# Patient Record
Sex: FEMALE | Race: BLACK OR AFRICAN AMERICAN | Employment: OTHER | URBAN - METROPOLITAN AREA
[De-identification: names, ages, dates, MRNs, and addresses within clinical notes are randomized per-mention and may not be internally consistent; named-entity substitution may affect disease eponyms.]

---

## 2020-04-02 ENCOUNTER — APPOINTMENT (OUTPATIENT)
Dept: CT IMAGING | Age: 85
End: 2020-04-02
Attending: NURSE PRACTITIONER
Payer: MEDICARE

## 2020-04-02 ENCOUNTER — HOSPITAL ENCOUNTER (OUTPATIENT)
Age: 85
Setting detail: OBSERVATION
Discharge: HOME HEALTH | End: 2020-04-03
Attending: EMERGENCY MEDICINE | Admitting: HOSPITALIST
Payer: MEDICARE

## 2020-04-02 ENCOUNTER — APPOINTMENT (OUTPATIENT)
Dept: GENERAL RADIOLOGY | Age: 85
End: 2020-04-02
Attending: NURSE PRACTITIONER
Payer: MEDICARE

## 2020-04-02 DIAGNOSIS — K85.90 ACUTE PANCREATITIS, UNSPECIFIED COMPLICATION STATUS, UNSPECIFIED PANCREATITIS TYPE: ICD-10-CM

## 2020-04-02 DIAGNOSIS — D72.829 LEUKOCYTOSIS, UNSPECIFIED TYPE: ICD-10-CM

## 2020-04-02 DIAGNOSIS — R10.84 ABDOMINAL PAIN, GENERALIZED: Primary | ICD-10-CM

## 2020-04-02 DIAGNOSIS — K35.80 ACUTE APPENDICITIS, UNSPECIFIED ACUTE APPENDICITIS TYPE: ICD-10-CM

## 2020-04-02 PROBLEM — K37 APPENDICITIS: Status: ACTIVE | Noted: 2020-04-02

## 2020-04-02 LAB
ALBUMIN SERPL-MCNC: 2.7 G/DL (ref 3.4–5)
ALBUMIN/GLOB SERPL: 0.6 {RATIO} (ref 0.8–1.7)
ALP SERPL-CCNC: 105 U/L (ref 45–117)
ALT SERPL-CCNC: 25 U/L (ref 13–56)
ANION GAP SERPL CALC-SCNC: 8 MMOL/L (ref 3–18)
APPEARANCE UR: CLEAR
AST SERPL-CCNC: 39 U/L (ref 10–38)
BACTERIA URNS QL MICRO: ABNORMAL /HPF
BASOPHILS # BLD: 0.1 K/UL (ref 0–0.1)
BASOPHILS NFR BLD: 0 % (ref 0–2)
BILIRUB SERPL-MCNC: 0.5 MG/DL (ref 0.2–1)
BILIRUB UR QL: NEGATIVE
BNP SERPL-MCNC: 8004 PG/ML (ref 0–1800)
BUN SERPL-MCNC: 43 MG/DL (ref 7–18)
BUN/CREAT SERPL: 35 (ref 12–20)
CALCIUM SERPL-MCNC: 9 MG/DL (ref 8.5–10.1)
CHLORIDE SERPL-SCNC: 108 MMOL/L (ref 100–111)
CO2 SERPL-SCNC: 23 MMOL/L (ref 21–32)
COLOR UR: YELLOW
CREAT SERPL-MCNC: 1.23 MG/DL (ref 0.6–1.3)
DIFFERENTIAL METHOD BLD: ABNORMAL
EOSINOPHIL # BLD: 0.6 K/UL (ref 0–0.4)
EOSINOPHIL NFR BLD: 3 % (ref 0–5)
EPITH CASTS URNS QL MICRO: ABNORMAL /LPF (ref 0–5)
ERYTHROCYTE [DISTWIDTH] IN BLOOD BY AUTOMATED COUNT: 14.5 % (ref 11.6–14.5)
GLOBULIN SER CALC-MCNC: 4.7 G/DL (ref 2–4)
GLUCOSE SERPL-MCNC: 135 MG/DL (ref 74–99)
GLUCOSE UR STRIP.AUTO-MCNC: NEGATIVE MG/DL
HCT VFR BLD AUTO: 36.7 % (ref 35–45)
HGB BLD-MCNC: 12.1 G/DL (ref 12–16)
HGB UR QL STRIP: NEGATIVE
KETONES UR QL STRIP.AUTO: NEGATIVE MG/DL
LACTATE SERPL-SCNC: 0.8 MMOL/L (ref 0.4–2)
LEUKOCYTE ESTERASE UR QL STRIP.AUTO: NEGATIVE
LIPASE SERPL-CCNC: 3072 U/L (ref 73–393)
LYMPHOCYTES # BLD: 2.8 K/UL (ref 0.9–3.6)
LYMPHOCYTES NFR BLD: 15 % (ref 21–52)
MCH RBC QN AUTO: 25.6 PG (ref 24–34)
MCHC RBC AUTO-ENTMCNC: 33 G/DL (ref 31–37)
MCV RBC AUTO: 77.6 FL (ref 74–97)
MONOCYTES # BLD: 1.2 K/UL (ref 0.05–1.2)
MONOCYTES NFR BLD: 6 % (ref 3–10)
NEUTS SEG # BLD: 13.9 K/UL (ref 1.8–8)
NEUTS SEG NFR BLD: 76 % (ref 40–73)
NITRITE UR QL STRIP.AUTO: NEGATIVE
PH UR STRIP: 5 [PH] (ref 5–8)
PLATELET # BLD AUTO: 493 K/UL (ref 135–420)
PMV BLD AUTO: 9.8 FL (ref 9.2–11.8)
POTASSIUM SERPL-SCNC: 5.2 MMOL/L (ref 3.5–5.5)
PROT SERPL-MCNC: 7.4 G/DL (ref 6.4–8.2)
PROT UR STRIP-MCNC: 30 MG/DL
RBC # BLD AUTO: 4.73 M/UL (ref 4.2–5.3)
RBC #/AREA URNS HPF: ABNORMAL /HPF (ref 0–5)
SODIUM SERPL-SCNC: 139 MMOL/L (ref 136–145)
SP GR UR REFRACTOMETRY: 1.02 (ref 1–1.03)
TROPONIN I SERPL-MCNC: <0.02 NG/ML (ref 0–0.04)
URATE CRY URNS QL MICRO: ABNORMAL
UROBILINOGEN UR QL STRIP.AUTO: 1 EU/DL (ref 0.2–1)
WBC # BLD AUTO: 18.5 K/UL (ref 4.6–13.2)
WBC URNS QL MICRO: ABNORMAL /HPF (ref 0–4)

## 2020-04-02 PROCEDURE — 71045 X-RAY EXAM CHEST 1 VIEW: CPT

## 2020-04-02 PROCEDURE — 81001 URINALYSIS AUTO W/SCOPE: CPT

## 2020-04-02 PROCEDURE — 80053 COMPREHEN METABOLIC PANEL: CPT

## 2020-04-02 PROCEDURE — 99285 EMERGENCY DEPT VISIT HI MDM: CPT

## 2020-04-02 PROCEDURE — 96375 TX/PRO/DX INJ NEW DRUG ADDON: CPT

## 2020-04-02 PROCEDURE — 85025 COMPLETE CBC W/AUTO DIFF WBC: CPT

## 2020-04-02 PROCEDURE — 99218 HC RM OBSERVATION: CPT

## 2020-04-02 PROCEDURE — 96367 TX/PROPH/DG ADDL SEQ IV INF: CPT

## 2020-04-02 PROCEDURE — 84484 ASSAY OF TROPONIN QUANT: CPT

## 2020-04-02 PROCEDURE — 83605 ASSAY OF LACTIC ACID: CPT

## 2020-04-02 PROCEDURE — 93005 ELECTROCARDIOGRAM TRACING: CPT

## 2020-04-02 PROCEDURE — 74011250637 HC RX REV CODE- 250/637: Performed by: NURSE PRACTITIONER

## 2020-04-02 PROCEDURE — 74011636320 HC RX REV CODE- 636/320: Performed by: EMERGENCY MEDICINE

## 2020-04-02 PROCEDURE — 83880 ASSAY OF NATRIURETIC PEPTIDE: CPT

## 2020-04-02 PROCEDURE — 74011250636 HC RX REV CODE- 250/636: Performed by: NURSE PRACTITIONER

## 2020-04-02 PROCEDURE — 96365 THER/PROPH/DIAG IV INF INIT: CPT

## 2020-04-02 PROCEDURE — 74177 CT ABD & PELVIS W/CONTRAST: CPT

## 2020-04-02 PROCEDURE — 74011000258 HC RX REV CODE- 258: Performed by: NURSE PRACTITIONER

## 2020-04-02 PROCEDURE — 74011000258 HC RX REV CODE- 258: Performed by: HOSPITALIST

## 2020-04-02 PROCEDURE — 83690 ASSAY OF LIPASE: CPT

## 2020-04-02 RX ORDER — ACETAMINOPHEN 325 MG/1
650 TABLET ORAL ONCE
Status: COMPLETED | OUTPATIENT
Start: 2020-04-02 | End: 2020-04-02

## 2020-04-02 RX ORDER — MORPHINE SULFATE 2 MG/ML
2 INJECTION, SOLUTION INTRAMUSCULAR; INTRAVENOUS
Status: DISCONTINUED | OUTPATIENT
Start: 2020-04-02 | End: 2020-04-03 | Stop reason: HOSPADM

## 2020-04-02 RX ORDER — CEFTRIAXONE 1 G/1
1 INJECTION, POWDER, FOR SOLUTION INTRAMUSCULAR; INTRAVENOUS EVERY 24 HOURS
Status: DISCONTINUED | OUTPATIENT
Start: 2020-04-02 | End: 2020-04-02

## 2020-04-02 RX ORDER — DEXTROSE MONOHYDRATE AND SODIUM CHLORIDE 5; .45 G/100ML; G/100ML
75 INJECTION, SOLUTION INTRAVENOUS CONTINUOUS
Status: DISCONTINUED | OUTPATIENT
Start: 2020-04-02 | End: 2020-04-03 | Stop reason: HOSPADM

## 2020-04-02 RX ORDER — SODIUM CHLORIDE 9 MG/ML
100 INJECTION, SOLUTION INTRAVENOUS ONCE
Status: COMPLETED | OUTPATIENT
Start: 2020-04-02 | End: 2020-04-02

## 2020-04-02 RX ORDER — METRONIDAZOLE 500 MG/100ML
500 INJECTION, SOLUTION INTRAVENOUS EVERY 8 HOURS
Status: DISCONTINUED | OUTPATIENT
Start: 2020-04-02 | End: 2020-04-03 | Stop reason: HOSPADM

## 2020-04-02 RX ORDER — ONDANSETRON 2 MG/ML
4 INJECTION INTRAMUSCULAR; INTRAVENOUS
Status: COMPLETED | OUTPATIENT
Start: 2020-04-02 | End: 2020-04-02

## 2020-04-02 RX ORDER — HEPARIN SODIUM 5000 [USP'U]/ML
5000 INJECTION, SOLUTION INTRAVENOUS; SUBCUTANEOUS EVERY 8 HOURS
Status: DISCONTINUED | OUTPATIENT
Start: 2020-04-02 | End: 2020-04-03 | Stop reason: HOSPADM

## 2020-04-02 RX ORDER — ACETAMINOPHEN 325 MG/1
650 TABLET ORAL
Status: DISCONTINUED | OUTPATIENT
Start: 2020-04-02 | End: 2020-04-03 | Stop reason: HOSPADM

## 2020-04-02 RX ADMIN — SODIUM CHLORIDE 3.38 G: 900 INJECTION INTRAVENOUS at 17:00

## 2020-04-02 RX ADMIN — CEFTRIAXONE 1 G: 1 INJECTION, POWDER, FOR SOLUTION INTRAMUSCULAR; INTRAVENOUS at 16:19

## 2020-04-02 RX ADMIN — ACETAMINOPHEN 650 MG: 325 TABLET, FILM COATED ORAL at 13:25

## 2020-04-02 RX ADMIN — DEXTROSE MONOHYDRATE AND SODIUM CHLORIDE 75 ML/HR: 5; .45 INJECTION, SOLUTION INTRAVENOUS at 21:39

## 2020-04-02 RX ADMIN — SODIUM CHLORIDE 100 ML/HR: 900 INJECTION, SOLUTION INTRAVENOUS at 13:23

## 2020-04-02 RX ADMIN — ONDANSETRON 4 MG: 2 INJECTION INTRAMUSCULAR; INTRAVENOUS at 13:23

## 2020-04-02 RX ADMIN — IOPAMIDOL 90 ML: 612 INJECTION, SOLUTION INTRAVENOUS at 15:28

## 2020-04-02 NOTE — H&P
Internal Medicine History and Physical          Subjective     HPI: [facility] is a 80 y.o. female AAF with abdominal pain. The patient is previous hospice patient for lung cancer deferred treatment, inoperable triple vessel disease who presented to the ED with several days of post-prandial pain. She states she is unable to tolerate hard food but liquids and soft do not cause the problem. She denies any nausea or vomiting. She admits to chronic abdominal pain over the past several months similar to this all after eating. She admits to coughing after drinking liquids. She denies any fever or chills. She hasn't had BM for a few days. She denies any SOB or CP. In the ED, on evaluation, she had no pain on palpation of her abdomen, including deep palpation. She did have decreased BS B/L. She was weak as well, which she admits to over past several days. Her vitals were WNL, with no fever. HR WNL. She had elevated white count but no other SIRS criteria. She had elevated lipase as well, but, again no abdominal pain on palpation nor N/V to diagnose pancreatitis. CT abd/pelvis was completed and it showed early signs of appendicitis. Given her age, co-morbidities (including lung ca, triple-vessel disease, morbid obesity), it is unlikely she is a candidate for surgery. She is not septic. Gen surg evaluated and no surgery intervention at this time.     PMHx:  Past Medical History:   Diagnosis Date    Chronic pain     CKD (chronic kidney disease) stage 3, GFR 30-59 ml/min ([facility])     Constipation     Diastolic CHF ([facility])     echo 70% 7/14 - Dr. Glory Harley DM2 (diabetes mellitus, type 2) ([facility] [place] [address]     GERD (gastroesophageal reflux disease)     Hyperlipidemia     Hypertension     Lung mass     Osteoarthritis of spine     back    Osteopenia     dexa 5/14    Pacemaker     PAD (peripheral artery disease) ([facility])     PAF (paroxysmal atrial fibrillation) ([facility])     history of    ROM (renal artery stenosis) ([address] 12/12    by ALEXY  Sick sinus syndrome (HCC)     pacer    STEMI (ST elevation myocardial infarction) ([facility] [place] [address]     Thyroid nodule     2 & 1 cm - need bx    Uterine prolapse     pessary? PSurgHx:  Past Surgical History:   Procedure Laterality Date    HX HEART CATHETERIZATION      HX Juliano Lubes Right     Dr. Yashira Thakur:  Social History     Socioeconomic History    Marital status:      Spouse name: Not on file    Number of children: Not on file    Years of education: Not on file    Highest education level: Not on file   Occupational History    Occupation: cook - retired   Social Needs    Financial resource strain: Not on file    Food insecurity     Worry: Not on file     Inability: Not on file   [employer] needs     Medical: Not on file     Non-medical: Not on file   Tobacco Use    Smoking status: Former Smoker     Types: Cigarettes     Last attempt to quit: 1993     Years since quittin.0   Substance and Sexual Activity    Alcohol use: No    Drug use: No    Sexual activity: Never     Comment:    Lifestyle    Physical activity     Days per week: Not on file     Minutes per session: Not on file    Stress: Not on file   Relationships    Social connections     Talks on phone: Not on file     Gets together: Not on file     Attends Christianity service: Not on file     Active member of club or organization: Not on file     Attends meetings of clubs or organizations: Not on file     Relationship status: Not on file    Intimate partner violence     Fear of current or ex partner: Not on file     Emotionally abused: Not on file     Physically abused: Not on file     Forced sexual activity: Not on file   Other Topics Concern    Not on file   Social History Narrative    Not on file       Allergies:   Allergies   Allergen Reactions    Ciprofloxacin Palpitations       FamilyHx:  Family History   Problem Relation Age of Onset    Kidney Disease Mother Prior to Admission Medications   Prescriptions Last Dose Informant Patient Reported? Taking? HYDROcodone-acetaminophen (NORCO)  mg tablet   No No   Sig: Take 1 Tab by mouth four (4) times daily as needed for Pain. Max Daily Amount: 4 Tabs. amiodarone (CORDARONE) 200 mg tablet   No No   Sig: Take 1 Tab by mouth daily. aspirin 81 mg chewable tablet   Yes No   Sig: Take 81 mg by Mouth Once a Day. docusate sodium (COLACE) 100 mg capsule   Yes No   Sig: Take 100 mg by mouth two (2) times a day. fentaNYL (DURAGESIC) 50 mcg/hr PATCH   No No   Si Patch by TransDERmal route every seventy-two (72) hours. Max Daily Amount: 1 Patch. fentaNYL (DURAGESIC) 75 mcg/hr   No No   Si Patch by TransDERmal route every seventy-two (72) hours. Max Daily Amount: 1 Patch. furosemide (LASIX) 40 mg tablet   No No   Sig: TAKE 1 TABLET (40 MG) ONCE A DAY IN THE AM AND THEN MAY USE 2ND DOSE IN EVENING AS NEEDED FOR SWELLING.   guaiFENesin-codeine (ROBITUSSIN AC)  mg/5 mL solution   No No   Sig: Take 10 mL by mouth nightly. Max Daily Amount: 10 mL.   hydrALAZINE (APRESOLINE) 25 mg tablet   No No   Sig: Take 1 Tab by mouth three (3) times daily. isosorbide dinitrate (ISORDIL) 30 mg tablet   No No   Sig: Take 1 Tab by mouth two (2) times a day. metoprolol succinate (TOPROL-XL) 50 mg XL tablet   No No   Sig: Take 1 Tab by mouth daily. morphine (ROXANOL) 100 mg/5 mL (20 mg/mL) concentrated solution   No No   Si - 10 mg every 2 hours as needed for SOB or pain. ondansetron hcl (ZOFRAN, AS HYDROCHLORIDE,) 4 mg tablet   Yes No   Sig: Take 4 mg by mouth every eight (8) hours as needed for Nausea. potassium chloride (K-DUR, KLOR-CON) 20 mEq tablet   No No   Sig: Take 0.5 tablets by mouth two (2) times a day. promethazine (PHENERGAN) 25 mg tablet   No No   Sig: Take 1 Tab by mouth every six (6) hours as needed for Nausea. senna (SENNA) 8.6 mg tablet   Yes No   Sig: Take 1 tablet by mouth daily. Facility-Administered Medications: None       Review of Systems:  CONST: Fever, weight loss, fatigue or chills  HEENT: Recent changes in vision, vertigo, epistaxis, dysphagia and hoarseness  CV: Chest pain, palpitations, edema and varicosities  RESP: Cough, shortness of breath, wheezing, hemoptysis, snoring and reactive airway disease  GI: Nausea, vomiting, abdominal pain, change in bowel habits, hematochezia, melena, and GERD   : Hematuria, dysuria, frequency, urgency, nocturia and stress urinary incontinence   MS: Weakness, joint pain and arthritis  ENDO: Polyuria, polydipsia, polyphagia, poor wound healing, heat intolerance, cold intolerance  LYMPH/HEME: Anemia, bruising and history of blood transfusions  INTEG: Dermatitis, abnormal moles  NEURO: Dizziness, headache, fainting, seizures and stroke  PSYCH: Anxiety and depression      Objective      Visit Vitals  /52   Pulse 62   Temp 97 °F (36.1 °C)   Resp 17   SpO2 96%       Physical Exam:  General Appearance: NAD, conversant  HENT: normocephalic/atraumatic, moist mucus membranes  Lungs: Decreased BS with normal respiratory effort  Cardiovascular: RRR, no m/r/g, capillary refill < 2 sec, B/L DP/PT pulses +3/4  Abdomen: soft, non-tender, normal bowel sounds  Extremities: no cyanosis, no peripheral edema  Neuro: moves all extremities, no focal deficits  Psych: appropriate affect, alert and oriented to person, place and time    Laboratory Studies:  CMP:   Lab Results   Component Value Date/Time     04/02/2020 01:17 PM    K 5.2 04/02/2020 01:17 PM     04/02/2020 01:17 PM    CO2 23 04/02/2020 01:17 PM    AGAP 8 04/02/2020 01:17 PM     (H) 04/02/2020 01:17 PM    BUN 43 (H) 04/02/2020 01:17 PM    CREA 1.23 04/02/2020 01:17 PM    GFRAA 50 (L) 04/02/2020 01:17 PM    GFRNA 41 (L) 04/02/2020 01:17 PM    CA 9.0 04/02/2020 01:17 PM    ALB 2.7 (L) 04/02/2020 01:17 PM    TP 7.4 04/02/2020 01:17 PM    GLOB 4.7 (H) 04/02/2020 01:17 PM    AGRAT 0.6 (L) 04/02/2020 01:17 PM    SGOT 39 (H) 04/02/2020 01:17 PM    ALT 25 04/02/2020 01:17 PM     CBC:   Lab Results   Component Value Date/Time    WBC 18.5 (H) 04/02/2020 01:17 PM    HGB 12.1 04/02/2020 01:17 PM    HCT 36.7 04/02/2020 01:17 PM     (H) 04/02/2020 01:17 PM     All Cardiac Markers in the last 24 hours:   Lab Results   Component Value Date/Time    TROIQ <0.02 04/02/2020 01:17 PM       Imaging Reviewed:  Ct Abd Pelv W Cont    Result Date: 4/2/2020  EXAM: CT of the abdomen and pelvis INDICATION: Pain. COMPARISON: None. TECHNIQUE: Axial CT imaging of the abdomen and pelvis was performed with intravenous contrast. Multiplanar reformats were generated. One or more dose reduction techniques were used on this CT: automated exposure control, adjustment of the mAs and/or kVp according to patient size, and iterative reconstruction techniques. The specific techniques used on this CT exam have been documented in the patient's electronic medical record. Digital Imaging and Communications in Medicine (DICOM) format image data are available to nonaffiliated external healthcare facilities or entities on a secure, media free, reciprocally searchable basis with patient authorization for at least a 12-month period after this study. _______________ FINDINGS: LOWER CHEST: Patchy bilateral interstitial infiltrates with groundglass densities. Cardiomegaly. LIVER, BILIARY: Liver is normal. No biliary dilation. Gallbladder is unremarkable. PANCREAS: Normal. SPLEEN: Normal. ADRENALS: Normal. KIDNEYS/URETERS/BLADDER: No calcification, hydronephrosis, or soft tissue attenuation renal mass. Bilateral cortical defects versus cysts. PELVIC ORGANS: Unremarkable. VASCULATURE: Extensive vascular calcifications LYMPH NODES: No enlarged lymph nodes. GASTROINTESTINAL TRACT: Appendix measures up to 9 mm in diameter with mild periappendiceal stranding. Diverticulosis. No drainable fluid collection.  BONES: No acute or aggressive osseous abnormalities identified. Right hip arthroplasty. OTHER: None. _______________     IMPRESSION: Findings suggestive of early acute appendicitis without perforation or abscess formation. Diverticulosis. Cardiomegaly Bilateral lung base interstitial infiltrates and groundglass opacities, representing edema versus atypical infection. Xr Chest Port    Result Date: 4/2/2020  EXAM: XR CHEST PORT CLINICAL INDICATION/HISTORY: SOB, dyspnea -Additional: None COMPARISON: None TECHNIQUE: Portable frontal view of the chest _______________ FINDINGS: SUPPORT DEVICES: None. HEART AND MEDIASTINUM: Cardiomegaly. Left chest last views/pacemaker. LUNGS AND PLEURAL SPACES: Hypoinflated lungs. Small right and small to moderate left pleural effusions with adjacent atelectasis. No pneumothorax. Mild interstitial edema. _______________     IMPRESSION: Hypoinflated lungs. Small right and small to moderate left pleural effusions with adjacent atelectasis. No pneumothorax. Mild interstitial edema. Assessment/Plan     Active Hospital Problems    Diagnosis Date Noted    Appendicitis 20/73/3598    Diastolic CHF (HCC)      echo 70% 7/14 - Dr. Valeria Garza      Paroxysmal atrial fibrillation ([facility] [address] 07/09/2014    HTN (hypertension) 07/09/2014    CKD (chronic kidney disease) stage 3, GFR 30-59 ml/min ([facility] [address] 07/09/2014     [facility], Dr Jhonatan Hay         CAD (coronary artery disease) 07/09/2014     Stress test 7/2013  Nuclear imaging reveals a moderate area of inferolateral scar. There is   no definite evidence of reversible myocardial ischemia.   The global ejection fraction is 64% with no regional wall motion   abnormalities.         - NPO for now  - IV fluids  - Antibx per gensurg, will use rocephin given local antibiogram and allergy to cipro  - No plans for intervention  - Lipase is high, but nothing else to suggest pancreatitis  - PT/OT  - Cont acceptable home medications for chronic conditions   - DVT protocol    I have personally reviewed all pertinent labs, films and EKGs that have officially resulted. I reviewed available electronic documentation outlining the initial presentation as well as the emergency room physician's encounter.     Elsie Tucker DO  Internal Medicine, Hospitalist  Pager: [phone]

## 2020-04-02 NOTE — PROGRESS NOTES
Called to evaluate 81 yo AAF with abdominal pain. The patient is previous hospice patient for lung cancer deferred treatment, inoperable triple vessel disease who presented to the ED with several days of post-prandial pain. She states she is unable to tolerate hard food but liquids and soft do not cause the problem. She denies any nausea or vomiting. She admits to chronic abdominal pain over the past several months similar to this all after eating. She admits to coughing after drinking liquids. She denies any fever or chills. She hasn't had BM for a few days. She denies any SOB or CP. In the ED, on evaluation, she had no pain on palpation of her abdomen, including deep palpation. She did have decreased BS B/L. She was weak as well, which she admits to over past several days. Her vitals were WNL, with no fever. HR WNL. She had elevated white count but no other SIRS criteria. She had elevated lipase as well, but, again no abdominal pain on palpation nor N/V to diagnose pancreatitis. CT abd/pelvis was completed and it showed early signs of appendicitis. Given her age, co-morbidities (including lung ca, triple-vessel disease, morbid obesity), it is unlikely she is a candidate for surgery. She is not septic at this time and tolerating liquid diet thus she should be able to tolerate oral antibiotics after IV dosing today. Will defer further management to general surgery which is being contacted at this time. She is not interested in inpatient rehab thus home health with PT would definitely benefit this patient for her ambulatory issues. She would likely benefit from speech/swallow evaluation in the outpatient setting given her cough and CT findings that may point to chronic aspiration.  I discussed the case with Dr. Kari Zendejas and PA in ED and told them to page me should general surgery have a difference in opinion on management    [place], DO  Internal Medicine, Hospitalist  Pager: [phone]

## 2020-04-02 NOTE — CONSULTS
[address]    Name:  Arabella Marcus  MR#:   [id number]  :  1933  ACCOUNT #:  [de-identified]  DATE OF SERVICE:  2020      REASON FOR CONSULTATION:  Abdominal pain, possible appendicitis. HISTORY OF PRESENT ILLNESS:  The patient is an 14-year-old, debilitated woman who apparently came from home with a 4-month history of eating poorly, particularly in the last few days. She typically has abdominal pain after eating but had no nausea or vomiting. She has had no fever or chills but says she has an occasional cough particularly while trying to eat. She presents to the emergency room with on and off history of abdominal pain. Currently, she has no abdominal pain. I have been asked to see her after she was CT scanned and that indicated a possible early appendicitis. She is awake, alert, and oriented and can answer questions easily. She denies having abdominal pain. I can elicit no pain as well. PAST MEDICAL HISTORY:  Heart failure, uterine prolapse, coronary artery disease of at least 3 vessels that is not reconstructible. Diabetes, hypertension, chronic renal disease stage III, renal insufficiency, paroxysmal atrial fibrillation, sick sinus syndrome, history of H. pylori, dyslipidemia, STEMI at some point, time unknown, chronic constipation, chronic pain, renal stenosis, osteopenia, thyroid nodule, lung cancer, pulmonary emboli, chest pain, history of UTIs, pacemaker, and morbid obesity. MEDICATIONS:  Amiodarone, Eliquis, aripiprazole, aspirin, Colace, Aricept, Cymbalta, Duragesic, Lasix, gabapentin, Neurontin, Robitussin, Apresoline, Norco, cortisone, Isordil, Ativan, metoprolol, Prilosec, Zofran, OxyContin, K-Dur, Phenergan, Senna, and multivitamins. FAMILY HISTORY:  Her family history is not obtainable.     REVIEW OF SYSTEMS:  Negative for headache, negative for vision problems, negative for HEENT problems, negative for chest pain, negative for shortness of breath, positive for cough, negative for abdominal pain, negative for  symptoms, negative for musculoskeletal symptoms, negative for urologic symptoms, negative for neurologic symptoms, and negative for psychiatric symptoms. PHYSICAL EXAMINATION:  GENERAL:  She is awake, alert, oriented x3 in no apparent distress. HEAD AND NECK:  Normocephalic, atraumatic. Her pupils are equal.  Her sclerae are anicteric. Nose and throat are clear. CHEST:  Clear bilaterally. HEART:  Regular rate and rhythm. ABDOMEN:  Soft, nontender, without rebound or guarding. EXTREMITIES:  Warm and dry. NEUROLOGIC:  She is intact. LABORATORY DATA:  White blood cell count of 18,500 with a mild left shift. Her electrolytes are normal except for a glucose of 135. She has a BUN of 43 and a creatinine of 1.23 both of which are stable. She has a normal bilirubin. Her liver function tests are essentially normal.  Her lipase is 3072. Her troponin is less than 0.02, and her BNP is 8.004. She has a CT scan as above which I have reviewed the images of, and she has essentially no acute changes. She does have a measured appendix of 9 mm which led to a possible early appendicitis conjecture; however, this is anything but certain. IMPRESSION:  I doubt seriously she has appendicitis and that she is nontender and has not had any symptoms of the disease. She does have a hyperlipasemia suggesting a chemical pancreatitis, but on CT scan, her pancreas while generous has no fluid collections or inflammatory changes. In either situation, she does not have an acute abdomen. She could have intestinal angina given her long history of eating and having abdominal pain; however, this is chronic in nature and not pertinent to today's visit. No significant signs of appendicitis. No surgery planned. She should have antibiotics for her white count and consideration for admission for failure to thrive.   We will follow with you if she is admitted.       Paradise Mcguire MD [id number]  D:  04/02/2020 17:32  T:  04/02/2020 18:57  JOB #:  [id number]

## 2020-04-02 NOTE — PROGRESS NOTES
Surgery  Asked to see this 79 yo woman with hx of 4 mo of abd pain after eating. Currently non-tender and without rebound or guarding. She has a hx of Lung cancer and severe non-reconstructable CAD. She is fairly debilitated and is currently not eating or drinking. Her PE is unimpressive and I can elicit no abd pain. She can answer some questions and is Ox3  Labs show a WBC of 18k and a Lipase of 3000. UA is OK  I have reviewed the images of her CT which is read as poss. Early appendicitis but this is pretty soft based on a 9mm appy. Given her hx and PE I doubt she has Appendicitis, but by chemistry she has Pancreatitis. Intestinal angina is also not out of the question. No Plan for surgery, would treat with Cipro/Flagyl for WBC. Should she develop signs more like appendicitis would perc drain and continue abx.    If admitted will follow

## 2020-04-02 NOTE — ED PROVIDER NOTES
EMERGENCY DEPARTMENT HISTORY AND PHYSICAL EXAM    4:04 PM      Date: 4/2/2020  Patient Name: Gilbert Banegas    History of Presenting Illness     Chief Complaint   Patient presents with    Abdominal Pain    Decreased Appetite    Fatigue     History Provided By: Patient  Chief complaint: Abdominal pain with nausea, decreased appetite, fatigue. Ongoing times several months. Worsening in the last 4 to 5 days. With associated generalized weakness. No focal neuro deficits. No chest pain or chest discomfort. No shortness of breath. No vomiting or diarrhea. No constipation. No bilateral lower leg edema or calf pain. Additional History (Context): Gilbert Banegas is a 80 y.o. female with past medical history as noted below who presented to the ED as noted above.     PCP: Camilla Yanes DO    Current Facility-Administered Medications   Medication Dose Route Frequency Provider Last Rate Last Dose    acetaminophen (TYLENOL) tablet 650 mg  650 mg Oral Q4H PRN Beather He H, DO        dextrose 5 % - 0.45% NaCl infusion  75 mL/hr IntraVENous CONTINUOUS Beather He H, DO        heparin (porcine) injection 5,000 Units  5,000 Units SubCUTAneous Q8H Beather He H, DO        morphine injection 2 mg  2 mg IntraVENous Q3H PRN Ky Plate, DO        [START ON 4/3/2020] cefTRIAXone (ROCEPHIN) 1 g in 0.9% sodium chloride (MBP/ADV) 50 mL MBP  1 g IntraVENous Q24H Beather He H, DO        metroNIDAZOLE (FLAGYL) IVPB premix 500 mg  500 mg IntraVENous Q8H Ky Plate, DO           Past History     Past Medical History:  Past Medical History:   Diagnosis Date    Chronic pain     CKD (chronic kidney disease) stage 3, GFR 30-59 ml/min (HCC)     Constipation     Diastolic CHF (HCC)     echo 70% 7/14 - Dr. Patti Salas    DM2 (diabetes mellitus, type 2) ([facility]     GERD (gastroesophageal reflux disease)     Hyperlipidemia     Hypertension     Lung mass     Osteoarthritis of spine back    Osteopenia     dexa     Pacemaker     [facility]     PAF (paroxysmal atrial fibrillation) ([facility])     history of    ROM (renal artery stenosis) ([facility]     by PVL    Sick sinus syndrome ([facility])     pacer    [facility]     Thyroid nodule     2 & 1 cm - need bx    Uterine prolapse     pessary? Past Surgical History:  Past Surgical History:   Procedure Laterality Date    HX HEART CATHETERIZATION      HX Len Christensen Right     Dr. Ollie Carlton HX PACEMAKER         Family History:  Family History   Problem Relation Age of Onset    Kidney Disease Mother        Social History:  Social History     Tobacco Use    Smoking status: Former Smoker     Types: Cigarettes     Last attempt to quit: 1993     Years since quittin.0   Substance Use Topics    Alcohol use: No    Drug use: No       Allergies: Allergies   Allergen Reactions    Ciprofloxacin Palpitations         Review of Systems       Review of Systems   Constitutional: Positive for appetite change and fatigue. Negative for chills, diaphoresis and fever. HENT: Negative for congestion, ear pain and sore throat. Eyes: Negative for pain. Respiratory: Negative for cough, chest tightness, shortness of breath and wheezing. Cardiovascular: Negative for chest pain, palpitations and leg swelling. Gastrointestinal: Positive for abdominal pain and nausea. Negative for constipation, diarrhea and vomiting. Genitourinary: Negative for dysuria, flank pain, hematuria, pelvic pain, vaginal bleeding and vaginal discharge. Musculoskeletal: Negative for back pain, joint swelling, neck pain and neck stiffness. Neurological: Positive for weakness. Negative for dizziness, light-headedness and headaches.          Physical Exam     Visit Vitals  /62 (BP 1 Location: Right arm, BP Patient Position: Head of bed elevated (Comment degrees))   Pulse 64   Temp 97.4 °F (36.3 °C)   Resp 17   Wt 69.6 kg (153 lb 6.4 oz)   SpO2 95%   BMI 30.98 kg/m²         Physical Exam  Vitals signs and nursing note reviewed. Constitutional:       General: She is not in acute distress. Appearance: Normal appearance. She is not ill-appearing, toxic-appearing or diaphoretic. HENT:      Head: Normocephalic and atraumatic. Neck:      Musculoskeletal: Normal range of motion and neck supple. No neck rigidity or muscular tenderness. Cardiovascular:      Rate and Rhythm: Normal rate and regular rhythm. Pulses: Normal pulses. Heart sounds: Normal heart sounds. No murmur. Pulmonary:      Effort: Pulmonary effort is normal. No respiratory distress. Breath sounds: Normal breath sounds. No wheezing. Abdominal:      General: Bowel sounds are normal. There is no distension. Palpations: Abdomen is soft. Tenderness: There is generalized abdominal tenderness. There is no right CVA tenderness, left CVA tenderness, guarding or rebound. Negative signs include James's sign. Musculoskeletal: Normal range of motion. Skin:     General: Skin is warm and dry. Capillary Refill: Capillary refill takes less than 2 seconds. Neurological:      General: No focal deficit present. Mental Status: She is alert and oriented to person, place, and time.    Psychiatric:         Behavior: Behavior normal.       Diagnostic Study Results     Labs -  Recent Results (from the past 12 hour(s))   EKG, 12 LEAD, INITIAL    Collection Time: 04/02/20  1:09 PM   Result Value Ref Range    Ventricular Rate 62 BPM    Atrial Rate 62 BPM    P-R Interval 96 ms    QRS Duration 76 ms    Q-T Interval 440 ms    QTC Calculation (Bezet) 446 ms    Calculated P Axis -76 degrees    Calculated R Axis 10 degrees    Calculated T Axis 167 degrees    Diagnosis       Unusual P axis and short NJ, probable junctional rhythm  Voltage criteria for left ventricular hypertrophy ( R in aVL , Sokolow-Rojas ,   [place] product )  Marked ST abnormality, possible lateral subendocardial injury  Abnormal ECG  No previous ECGs available     CBC WITH AUTOMATED DIFF    Collection Time: 04/02/20  1:17 PM   Result Value Ref Range    WBC 18.5 (H) 4.6 - 13.2 K/uL    RBC 4.73 4.20 - 5.30 M/uL    HGB 12.1 12.0 - 16.0 g/dL    HCT 36.7 35.0 - 45.0 %    MCV 77.6 74.0 - 97.0 FL    MCH 25.6 24.0 - 34.0 PG    MCHC 33.0 31.0 - 37.0 g/dL    RDW 14.5 11.6 - 14.5 %    PLATELET 614 (H) 885 - 420 K/uL    MPV 9.8 9.2 - 11.8 FL    NEUTROPHILS 76 (H) 40 - 73 %    LYMPHOCYTES 15 (L) 21 - 52 %    MONOCYTES 6 3 - 10 %    EOSINOPHILS 3 0 - 5 %    BASOPHILS 0 0 - 2 %    ABS. NEUTROPHILS 13.9 (H) 1.8 - 8.0 K/UL    ABS. LYMPHOCYTES 2.8 0.9 - 3.6 K/UL    ABS. MONOCYTES 1.2 0.05 - 1.2 K/UL    ABS. EOSINOPHILS 0.6 (H) 0.0 - 0.4 K/UL    ABS. BASOPHILS 0.1 0.0 - 0.1 K/UL    DF AUTOMATED     METABOLIC PANEL, COMPREHENSIVE    Collection Time: 04/02/20  1:17 PM   Result Value Ref Range    Sodium 139 136 - 145 mmol/L    Potassium 5.2 3.5 - 5.5 mmol/L    Chloride 108 100 - 111 mmol/L    CO2 23 21 - 32 mmol/L    Anion gap 8 3.0 - 18 mmol/L    Glucose 135 (H) 74 - 99 mg/dL    BUN 43 (H) 7.0 - 18 MG/DL    Creatinine 1.23 0.6 - 1.3 MG/DL    BUN/Creatinine ratio 35 (H) 12 - 20      GFR est AA 50 (L) >60 ml/min/1.73m2    GFR est non-AA 41 (L) >60 ml/min/1.73m2    Calcium 9.0 8.5 - 10.1 MG/DL    Bilirubin, total 0.5 0.2 - 1.0 MG/DL    ALT (SGPT) 25 13 - 56 U/L    AST (SGOT) 39 (H) 10 - 38 U/L    Alk.  phosphatase 105 45 - 117 U/L    Protein, total 7.4 6.4 - 8.2 g/dL    Albumin 2.7 (L) 3.4 - 5.0 g/dL    Globulin 4.7 (H) 2.0 - 4.0 g/dL    A-G Ratio 0.6 (L) 0.8 - 1.7     NT-PRO BNP    Collection Time: 04/02/20  1:17 PM   Result Value Ref Range    NT pro-BNP 8,004 (H) 0 - 1,800 PG/ML   TROPONIN I    Collection Time: 04/02/20  1:17 PM   Result Value Ref Range    Troponin-I, QT <0.02 0.0 - 0.045 NG/ML   LIPASE    Collection Time: 04/02/20  1:17 PM   Result Value Ref Range    Lipase 3,072 (H) 73 - 393 U/L   URINALYSIS W/ RFLX MICROSCOPIC    Collection Time: 04/02/20  1:43 PM   Result Value Ref Range    Color YELLOW      Appearance CLEAR      Specific gravity 1.019 1.005 - 1.030      pH (UA) 5.0 5.0 - 8.0      Protein 30 (A) NEG mg/dL    Glucose NEGATIVE  NEG mg/dL    Ketone NEGATIVE  NEG mg/dL    Bilirubin NEGATIVE  NEG      Blood NEGATIVE  NEG      Urobilinogen 1.0 0.2 - 1.0 EU/dL    Nitrites NEGATIVE  NEG      Leukocyte Esterase NEGATIVE  NEG     URINE MICROSCOPIC ONLY    Collection Time: 04/02/20  1:43 PM   Result Value Ref Range    WBC 0 to 1 0 - 4 /hpf    RBC 0 to 1 0 - 5 /hpf    Epithelial cells 1+ 0 - 5 /lpf    Bacteria FEW (A) NEG /hpf    Uric acid crystals 2+ (A) NEG   LACTIC ACID    Collection Time: 04/02/20  3:42 PM   Result Value Ref Range    Lactic acid 0.8 0.4 - 2.0 MMOL/L       Radiologic Studies -   CT ABD PELV W CONT   Final Result   IMPRESSION:      Findings suggestive of early acute appendicitis without perforation or abscess   formation. Diverticulosis. Cardiomegaly Bilateral lung base interstitial infiltrates and groundglass   opacities, representing edema versus atypical infection. XR CHEST PORT   Final Result   IMPRESSION:      Hypoinflated lungs. Small right and small to moderate left pleural effusions   with adjacent atelectasis. No pneumothorax. Mild interstitial edema. Medical Decision Making   I am the first provider for this patient. I reviewed the vital signs, available nursing notes, past medical history, past surgical history, family history and social history. Vital Signs-Reviewed the patient's vital signs. Records Reviewed: Nursing Notes and Old Medical Records (Time of Review: 4:04 PM)    ED Course: Progress Notes, Reevaluation, and Consults:      Provider Notes (Medical Decision Making):  This is an 71-year-old female with past medical history significant for coronary artery disease, lung cancer, PE, sick sinus syndrome status post PPM implantation 2013, dyslipidemia, diabetes, and hypertension presented to the ED with chief complaint of abdominal pain with nausea no vomiting, decreased appetite, generalized weakness and fatigue ongoing times few months exacerbated in the last 4-5 days. She is afebrile. In no acute distress. No work of breathing. No use of accessory muscles. Lung sounds diminished to bilateral lower lobes. Generalized abdominal tenderness on palpation, no rebound or guarding. No CVA tenderness appreciated. No bilateral lower extremity edema. No focal neuro deficits. Neurovascularly intact. CBC with leukocytosis of 18.5. Urinalysis negative for you acute urinary tract infection. Chemistry unremarkable. LFTs reassuring. Lipase 3072. Lactic acid 0.8. Charlotte Dye CT abdomen/pelvis with contrast with findings suggestive of early acute appendicitis without perforation or abscess formation, diverticulosis, bilateral lung base interstitial infiltrates and groundglass opacities representing edema versus atypical infection. 18:00: HPI, presenting illness/chief complaint, labs, imaging discussed with hospitalist Dr. Hafsa Spence.    18:20: HPI, presenting illness/chief complaint, labs, imaging discussed with surgery Dr. Francesco Flores. Recommends keeping patient n.p.o. Continue IV Zosyn. Patient at this time will be admitted to the hospitalist team for further treatment of acute appendicitis, ?suspected pancreatitis. Diagnosis     Clinical Impression:   1. Abdominal pain, generalized    2. Acute appendicitis, unspecified acute appendicitis type    3. Acute pancreatitis, unspecified complication status, unspecified pancreatitis type        Disposition: ADMT TO HOSPITAL     Follow-up Information    None          Current Discharge Medication List      CONTINUE these medications which have NOT CHANGED    Details   amiodarone (CORDARONE) 200 mg tablet Take 1 Tab by mouth daily.   Qty: 30 Tab, Refills: 5      metoprolol succinate (TOPROL-XL) 50 mg XL tablet Take 1 Tab by mouth daily. Qty: 30 Tab, Refills: 5      fentaNYL (DURAGESIC) 75 mcg/hr 1 Patch by TransDERmal route every seventy-two (72) hours. Max Daily Amount: 1 Patch. Qty: 30 Patch, Refills: 0      furosemide (LASIX) 40 mg tablet TAKE 1 TABLET (40 MG) ONCE A DAY IN THE AM AND THEN MAY USE 2ND DOSE IN EVENING AS NEEDED FOR SWELLING. Qty: 180 Tab, Refills: 1      HYDROcodone-acetaminophen (NORCO)  mg tablet Take 1 Tab by mouth four (4) times daily as needed for Pain. Max Daily Amount: 4 Tabs. Qty: 70 Tab, Refills: 0      hydrALAZINE (APRESOLINE) 25 mg tablet Take 1 Tab by mouth three (3) times daily. Qty: 90 Tab, Refills: 5      fentaNYL (DURAGESIC) 50 mcg/hr PATCH 1 Patch by TransDERmal route every seventy-two (72) hours. Max Daily Amount: 1 Patch. Qty: 5 Patch, Refills: 0      promethazine (PHENERGAN) 25 mg tablet Take 1 Tab by mouth every six (6) hours as needed for Nausea. Qty: 60 Tab, Refills: 2      ondansetron hcl (ZOFRAN, AS HYDROCHLORIDE,) 4 mg tablet Take 4 mg by mouth every eight (8) hours as needed for Nausea. morphine (ROXANOL) 100 mg/5 mL (20 mg/mL) concentrated solution 5 - 10 mg every 2 hours as needed for SOB or pain. Qty: 240 mL, Refills: 1      guaiFENesin-codeine (ROBITUSSIN AC)  mg/5 mL solution Take 10 mL by mouth nightly. Max Daily Amount: 10 mL. Qty: 180 mL, Refills: 0      isosorbide dinitrate (ISORDIL) 30 mg tablet Take 1 Tab by mouth two (2) times a day. Qty: 60 Tab, Refills: 5      potassium chloride (K-DUR, KLOR-CON) 20 mEq tablet Take 0.5 tablets by mouth two (2) times a day. Qty: 45 tablet, Refills: 3      senna (SENNA) 8.6 mg tablet Take 1 tablet by mouth daily. docusate sodium (COLACE) 100 mg capsule Take 100 mg by mouth two (2) times a day. aspirin 81 mg chewable tablet Take 81 mg by Mouth Once a Day.              Dictation disclaimer:  Please note that this dictation was completed with [employer] computer voice recognition software. Quite often unanticipated grammatical, syntax, homophones, and other interpretive errors are inadvertently transcribed by the computer software. Please disregard these errors. Please excuse any errors that have escaped final proofreading.

## 2020-04-02 NOTE — ED TRIAGE NOTES
Pt arrived via EMS with complaint of right lower abd pain that worsens after eating x 1 month. Also reports decreased appetite, general weakness, and multiple other complaints.

## 2020-04-03 ENCOUNTER — HOME HEALTH ADMISSION (OUTPATIENT)
Dept: HOME HEALTH SERVICES | Facility: HOME HEALTH | Age: 85
End: 2020-04-03

## 2020-04-03 VITALS
DIASTOLIC BLOOD PRESSURE: 58 MMHG | TEMPERATURE: 97.4 F | RESPIRATION RATE: 18 BRPM | BODY MASS INDEX: 30.12 KG/M2 | HEIGHT: 60 IN | WEIGHT: 153.4 LBS | OXYGEN SATURATION: 96 % | HEART RATE: 63 BPM | SYSTOLIC BLOOD PRESSURE: 135 MMHG

## 2020-04-03 LAB
ATRIAL RATE: 62 BPM
BASOPHILS # BLD: 0 K/UL (ref 0–0.1)
BASOPHILS NFR BLD: 0 % (ref 0–2)
CALCULATED P AXIS, ECG09: -76 DEGREES
CALCULATED R AXIS, ECG10: 10 DEGREES
CALCULATED T AXIS, ECG11: 167 DEGREES
DIAGNOSIS, 93000: NORMAL
DIFFERENTIAL METHOD BLD: ABNORMAL
EOSINOPHIL # BLD: 0.9 K/UL (ref 0–0.4)
EOSINOPHIL NFR BLD: 8 % (ref 0–5)
ERYTHROCYTE [DISTWIDTH] IN BLOOD BY AUTOMATED COUNT: 15.2 % (ref 11.6–14.5)
HBA1C MFR BLD: 6.5 % (ref 4.2–5.6)
HCT VFR BLD AUTO: 34.1 % (ref 35–45)
HGB BLD-MCNC: 10.8 G/DL (ref 12–16)
LYMPHOCYTES # BLD: 2.2 K/UL (ref 0.9–3.6)
LYMPHOCYTES NFR BLD: 18 % (ref 21–52)
MCH RBC QN AUTO: 25.7 PG (ref 24–34)
MCHC RBC AUTO-ENTMCNC: 31.7 G/DL (ref 31–37)
MCV RBC AUTO: 81 FL (ref 74–97)
MONOCYTES # BLD: 1 K/UL (ref 0.05–1.2)
MONOCYTES NFR BLD: 8 % (ref 3–10)
NEUTS SEG # BLD: 8.2 K/UL (ref 1.8–8)
NEUTS SEG NFR BLD: 66 % (ref 40–73)
P-R INTERVAL, ECG05: 96 MS
PLATELET # BLD AUTO: 447 K/UL (ref 135–420)
PMV BLD AUTO: 9.6 FL (ref 9.2–11.8)
Q-T INTERVAL, ECG07: 440 MS
QRS DURATION, ECG06: 76 MS
QTC CALCULATION (BEZET), ECG08: 446 MS
RBC # BLD AUTO: 4.21 M/UL (ref 4.2–5.3)
VENTRICULAR RATE, ECG03: 62 BPM
WBC # BLD AUTO: 12.4 K/UL (ref 4.6–13.2)

## 2020-04-03 PROCEDURE — 74011000258 HC RX REV CODE- 258: Performed by: HOSPITALIST

## 2020-04-03 PROCEDURE — 74011250636 HC RX REV CODE- 250/636: Performed by: HOSPITALIST

## 2020-04-03 PROCEDURE — 96367 TX/PROPH/DG ADDL SEQ IV INF: CPT

## 2020-04-03 PROCEDURE — 96372 THER/PROPH/DIAG INJ SC/IM: CPT

## 2020-04-03 PROCEDURE — 83036 HEMOGLOBIN GLYCOSYLATED A1C: CPT

## 2020-04-03 PROCEDURE — 85025 COMPLETE CBC W/AUTO DIFF WBC: CPT

## 2020-04-03 PROCEDURE — 77010033678 HC OXYGEN DAILY

## 2020-04-03 PROCEDURE — 99218 HC RM OBSERVATION: CPT

## 2020-04-03 PROCEDURE — 96366 THER/PROPH/DIAG IV INF ADDON: CPT

## 2020-04-03 RX ORDER — CEPHALEXIN 500 MG/1
500 CAPSULE ORAL 4 TIMES DAILY
Qty: 32 CAP | Refills: 0 | Status: SHIPPED | OUTPATIENT
Start: 2020-04-03 | End: 2020-04-11

## 2020-04-03 RX ORDER — METRONIDAZOLE 500 MG/1
500 TABLET ORAL 3 TIMES DAILY
Qty: 24 TAB | Refills: 0 | Status: SHIPPED | OUTPATIENT
Start: 2020-04-03 | End: 2020-04-11

## 2020-04-03 RX ORDER — NALOXONE HYDROCHLORIDE 0.4 MG/ML
0.4 INJECTION, SOLUTION INTRAMUSCULAR; INTRAVENOUS; SUBCUTANEOUS AS NEEDED
Status: DISCONTINUED | OUTPATIENT
Start: 2020-04-03 | End: 2020-04-03 | Stop reason: HOSPADM

## 2020-04-03 RX ADMIN — METRONIDAZOLE 500 MG: 500 INJECTION, SOLUTION INTRAVENOUS at 09:47

## 2020-04-03 RX ADMIN — DEXTROSE MONOHYDRATE AND SODIUM CHLORIDE 75 ML/HR: 5; .45 INJECTION, SOLUTION INTRAVENOUS at 09:47

## 2020-04-03 RX ADMIN — METRONIDAZOLE 500 MG: 500 INJECTION, SOLUTION INTRAVENOUS at 01:44

## 2020-04-03 RX ADMIN — HEPARIN SODIUM 5000 UNITS: 5000 INJECTION INTRAVENOUS; SUBCUTANEOUS at 01:44

## 2020-04-03 RX ADMIN — HEPARIN SODIUM 5000 UNITS: 5000 INJECTION INTRAVENOUS; SUBCUTANEOUS at 09:48

## 2020-04-03 NOTE — PROGRESS NOTES
1356 Peripheral iv removed and pt dressed. Discharge instructions printed and written prescriptions for cephalexin and flagyl placed in pt belongings bag. Will review with daughter when she arrives to pick pt up. Per CM, daughter should arrive at 1400. Identification cards in pt belongings bag.    2416 Pt escorted via wheelchair to car to be driven by son-in-law. Discharge instructions reviewed with him to include prescriptions written. Pt left in stable condition.

## 2020-04-03 NOTE — PROGRESS NOTES
Surgery  Feels better, thirsty   AF VSS  Soft Non-tender abd exam without guarding or rebound. No RLQ pain. Labs ok but needs cbc. Seriously doubt appendicitis   Can liberalize diet.   following

## 2020-04-03 NOTE — DISCHARGE INSTRUCTIONS
DISCHARGE SUMMARY from Nurse    PATIENT INSTRUCTIONS:    After general anesthesia or intravenous sedation, for 24 hours or while taking prescription Narcotics:  · Limit your activities  · Do not drive and operate hazardous machinery  · Do not make important personal or business decisions  · Do  not drink alcoholic beverages  · If you have not urinated within 8 hours after discharge, please contact your surgeon on call. Report the following to your surgeon:  · Excessive pain, swelling, redness or odor of or around the surgical area  · Temperature over 100.5  · Nausea and vomiting lasting longer than 4 hours or if unable to take medications  · Any signs of decreased circulation or nerve impairment to extremity: change in color, persistent  numbness, tingling, coldness or increase pain  · Any questions    What to do at Home:  Recommended activity: Activity as tolerated    If you experience any of the following symptoms nausea, vomiting, or abdominal pain, please follow up with primary care physician. *  Please give a list of your current medications to your Primary Care Provider. *  Please update this list whenever your medications are discontinued, doses are      changed, or new medications (including over-the-counter products) are added. *  Please carry medication information at all times in case of emergency situations. These are general instructions for a healthy lifestyle:    No smoking/ No tobacco products/ Avoid exposure to second hand smoke  Surgeon General's Warning:  Quitting smoking now greatly reduces serious risk to your health.     Obesity, smoking, and sedentary lifestyle greatly increases your risk for illness    A healthy diet, regular physical exercise & weight monitoring are important for maintaining a healthy lifestyle    You may be retaining fluid if you have a history of heart failure or if you experience any of the following symptoms:  Weight gain of 3 pounds or more overnight or 5 pounds in a week, increased swelling in our hands or feet or shortness of breath while lying flat in bed. Please call your doctor as soon as you notice any of these symptoms; do not wait until your next office visit. The discharge information has been reviewed with the patient. The patient verbalized understanding. Discharge medications reviewed with the patient and appropriate educational materials and side effects teaching were provided. Patient armband removed and shredded.

## 2020-04-03 NOTE — PROGRESS NOTES
Problem: Falls - Risk of  Goal: *Absence of Falls  Description: Document Louiechristopher Pelayo Fall Risk and appropriate interventions in the flowsheet.   Outcome: Progressing Towards Goal  Note: Fall Risk Interventions:                                Problem: Patient Education: Go to Patient Education Activity  Goal: Patient/Family Education  Outcome: Progressing Towards Goal     Problem: Pain  Goal: *Control of Pain  Outcome: Progressing Towards Goal     Problem: Patient Education: Go to Patient Education Activity  Goal: Patient/Family Education  Outcome: Progressing Towards Goal

## 2020-04-03 NOTE — PROGRESS NOTES
conducted an initial consultation and Spiritual Assessment for [facility], who is a 80 y.o.,female. Patients Primary Language is: [place]. According to the patients EMR Confucianist Affiliation is: [facility].     The reason the Patient came to the hospital is:   Patient Active Problem List    Diagnosis Date Noted    Appendicitis 04/02/2020    Lung mass     Hyperlipidemia     [facility]     Constipation     Chronic pain     [facility] stenosis) ([facility]     Osteopenia     Diastolic CHF ([facility]     Thyroid nodule     Congestive heart failure ([facility] 07/09/2014    Uterine prolapse 07/09/2014    CAD (coronary artery disease) 07/09/2014    Diabetes mellitus type 2, controlled, without complications ([facility] 54/98/1595    HTN (hypertension) 07/09/2014    CKD (chronic kidney disease) stage 3, GFR 30-59 ml/min ([facility]) 07/09/2014    Paroxysmal atrial fibrillation ([facility]) 07/09/2014    Sick sinus syndrome ([facility] 07/09/2014    Positive H. pylori titer 07/09/2014        The  provided the following Interventions:   attempted to Initiate a relationship of care and support with patient in room 3004 today but found her resting peacefully at this time and unable to communicate now. Provided information about Spiritual Care Services. Offered prayer and assurance of continued prayers on patients behalf. Assessment:  Patient does not have any Taoism/cultural needs that will affect patients preferences in health care. There are no further spiritual or Taoism issues which require Spiritual Care Services interventions at this time. Plan:  Chaplains will continue to follow and will provide pastoral care on an as needed/requested basis    . Ana Stokes   Spiritual Care   [phone]

## 2020-04-03 NOTE — PROGRESS NOTES
Bedside shift change report given to ÓSCAR Blanco (oncoming nurse) by Max Cantor RN (offgoing nurse). Report included the following information SBAR, Kardex, Intake/Output, MAR and Recent Results.      0947 -- AM medications given, well tolerated, will continue to monitor. 1030 -- Blood draw. 1202 -- Reassessment completed, no change in patient condition, will continue to monitor.     1419 -- Patient discharged.  Lakshmi Wilson

## 2020-04-03 NOTE — PROGRESS NOTES
Problem: Discharge Planning  Goal: *Discharge to safe environment  Outcome: Resolved/Met      Home health    Reason for Admission:   Appendicitis                   RUR Score:  0                   Plan for utilizing home health: Will need PT/OT       PCP:First and Last name:  [facility]   Name of Practice:    Are you a current patient: Yes/No:   Yes   Approximate date of last visit:  Sees pt on reg basis                    Current Advanced Directive/Advance Care Plan:   Has DNR scanned, no advanced directive scanned in system                         Transition of Care Plan:    Home with family with home health & out-pt follow up. Chart reviewed. Met with pt, very sleepy. Called pt's dtr, Neal Camarillo, no answer, left VM. Called home# spoke with pt's son-n-law, Kee Perry, states his wife is on hold with social security & has been for awhile. Pt lives with dtr & son-n-law. Has walker but walks without it around the house. Made him aware of pt in observation status & that she is being discharged today. Spoke with Daughter, Neal Camarillo @ 4350, verified above. States she will send her  to pick pt up. Made her aware that MD ordered home health, [facility] & [address] Provider list has been given to the patient and/or patient representative. Patient and/or patient representative has signed the [place] of Choice selecting [facility] as their preference agency and a copy given. Both Home Health Provider list and Freedom of Choice have been placed on the chart. [facility] in epic & [facility], left message. Nursing made aware that family will pick pt up. Available as needed. Joan EllerRN,ext [phone]. Patient has designated her daughter to participate in his/her discharge plan and to receive any needed information.      Name: Neal Camarillo  Address:  Phone number:  [phone] / [phone]    Patient and/or next of kin has been given and has signed the [id number] [facility] Observation  Notification letter and all questions answered. Copy of this notice given to patient and copy placed on chart. Patient and/or next of kin has been given the Outpatient Observation Information and Notification letter and all questions answered. Care Management Interventions  PCP Verified by CM: Yes( [facility])  Palliative Care Criteria Met (RRAT>21 & CHF Dx)?: No  Mode of Transport at Discharge:  Other (see comment)(family)  Transition of Care Consult (CM Consult): Discharge Planning  Discharge Durable Medical Equipment: No  Physical Therapy Consult: No  Occupational Therapy Consult: No  Speech Therapy Consult: No  Current Support Network: Relative's Home(lives with daughter, son-n-law.)  Confirm Follow Up Transport: Family  Discharge Location  Discharge Placement: Home with home health

## 2020-04-03 NOTE — DISCHARGE SUMMARY
Internal Medicine Discharge Summary        Patient: Jayden Billingsley    YOB: 1933    Age:  80 y.o. Admit Date: 4/2/2020    Discharge Date: 4/3/2020    LOS:  LOS: 0 days     Discharge To: Home    Consults: General Surgery    Admission Diagnoses: Appendicitis [K37]    Discharge Diagnoses:    Problem List as of 4/3/2020 Date Reviewed: 4/25/2016          Codes Class Noted - Resolved    * (Principal) Appendicitis ICD-10-CM: K37  ICD-9-CM: 919  4/2/2020 - Present        Lung mass ICD-10-CM: R91.8  ICD-9-CM: 737. 6  Unknown - Present        Hyperlipidemia ICD-10-CM: E78.5  ICD-9-CM: 272.4  Unknown - Present        STEMI (ST elevation myocardial infarction) ([facility].) ICD-10-CM: I21.3  ICD-9-CM: 410.90  Unknown - Present        Constipation ICD-10-CM: K59.00  ICD-9-CM: 564.00  Unknown - Present        Chronic pain ICD-10-CM: G89.29  ICD-9-CM: 338.29  Unknown - Present        ROM (renal artery stenosis) ([facility] ICD-10-CM: I70.1  ICD-9-CM: 440.1  Unknown - Present    Overview Signed 12/5/2014 11:24 AM by Kim MCDUFFIE     by PVL             Osteopenia ICD-10-CM: M85.80  ICD-9-CM: 733.90  Unknown - Present    Overview Signed 12/5/2014 11:24 AM by Kim MCDUFFIE     dexa 5/14             Diastolic CHF ([facility] CAG-99-RA: I50.30  ICD-9-CM: 428.30, 428.0  Unknown - Present    Overview Signed 12/5/2014 11:25 AM by Kim MCDUFFIE     echo 70% 7/14 - Dr. Renny Moore             Thyroid nodule ICD-10-CM: E04.1  ICD-9-CM: 241.0  Unknown - Present    Overview Signed 12/5/2014 11:29 AM by Kim MCDUFFIE     2 & 1 cm - need bx             Congestive heart failure ([facility] ICD-10-CM: I50.9  ICD-9-CM: 428.0  7/9/2014 - Present    Overview Addendum 7/9/2014 10:49 AM by Juan Bullock MD     Sees Dr. Crump Him  Echocardiogram 2013  NORMAL GLOBAL LEFT VENTRICULAR SYSTOLIC FUNCTION. EJECTION FRACTION IS 70%. ASYMMETRIC  SEPTAL HYPERTROPHY WITHOUT INCREASED OUTFLOW TRACT GRADIENT.   MILD DIASTOLIC DYSFUNCTION. DILATED LEFT ATRIUM. NORMAL PULMONARY ARTERY PRESSURE OF 29MMHG. Uterine prolapse ICD-10-CM: N81.4  ICD-9-CM: 618.1  7/9/2014 - Present        CAD (coronary artery disease) ICD-10-CM: I25.10  ICD-9-CM: 414.00  7/9/2014 - Present    Overview Signed 7/9/2014 10:38 AM by Jered Padilla MD     Stress test 7/2013  Nuclear imaging reveals a moderate area of inferolateral scar. There is   no definite evidence of reversible myocardial ischemia. The global ejection fraction is 64% with no regional wall motion   abnormalities. Diabetes mellitus type 2, controlled, without complications ([facility] GRR-50-JR: E11.9  ICD-9-CM: 250.00  7/9/2014 - Present    Overview Signed 7/9/2014 10:39 AM by Jered Padilla MD     [facility]- Dr. Rob Nieves last visit 6/2014             HTN (hypertension) ICD-10-CM: I10  ICD-9-CM: 401.9  7/9/2014 - Present        CKD (chronic kidney disease) stage 3, GFR 30-59 ml/min ([facility]) ICD-10-CM: N18.3  ICD-9-CM: 585.3  7/9/2014 - Present    Overview Signed 7/9/2014 10:51 AM by Jered Padilla MD     [facility]                Paroxysmal atrial fibrillation [facility]) ICD-10-CM: I48.0  ICD-9-CM: 427.31  7/9/2014 - Present        Sick sinus syndrome ([facility] ICD-10-CM: I49.5  ICD-9-CM: 427.81  7/9/2014 - Present    Overview Signed 7/9/2014 10:53 AM by Jered Padilla MD     S/p pacer             Positive H. pylori titer ICD-10-CM: R76.0  ICD-9-CM: 795.79  7/9/2014 - Present    Overview Signed 7/9/2014  1:26 PM by Jered Padilla MD     Negative stool antigen testing. Discharge Condition:  Improved    Procedures: None         HPI: Mikayla Bryant is a 80 y.o. female AAF with abdominal pain. The patient is previous hospice patient for lung cancer deferred treatment, inoperable triple vessel disease who presented to the ED with several days of post-prandial pain.  She states she is unable to tolerate hard food but liquids and soft do not cause the problem. She denies any nausea or vomiting. She admits to chronic abdominal pain over the past several months similar to this all after eating. She admits to coughing after drinking liquids. She denies any fever or chills. She hasn't had BM for a few days. She denies any SOB or CP. In the ED, on evaluation, she had no pain on palpation of her abdomen, including deep palpation. She did have decreased BS B/L. She was weak as well, which she admits to over past several days. Her vitals were WNL, with no fever. HR WNL. She had elevated white count but no other SIRS criteria. She had elevated lipase as well, but, again no abdominal pain on palpation nor N/V to diagnose pancreatitis. CT abd/pelvis was completed and it showed early signs of appendicitis. Given her age, co-morbidities (including lung ca, triple-vessel disease, morbid obesity), it is unlikely she is a candidate for surgery. She is not septic. Gen surg evaluated and no surgery intervention at this time. Hospital Course:    Mild Acute Early Appendicitis - General surgery recommended NPO and observe overnight. WBCs back within normal limits. She had no abdominal pain on exam nor complaining of any. She was treated with IV flagyl and rocephin which was transitioned to keflex/flagyl for an additional 8 days. General surgery stated OK for discharge as no plans for surgery. She was advised to continue with full liquid diet at home and advance as tolerated. The rest of the patient's chronic conditions were managed appropriately during their admission. They were medically stable at the time of discharge.     Visit Vitals  /58 (BP 1 Location: Right arm, BP Patient Position: Head of bed elevated (Comment degrees))   Pulse 63   Temp 97.4 °F (36.3 °C)   Resp 18   Ht 5' (1.524 m)   Wt 69.6 kg (153 lb 6.4 oz)   SpO2 96%   Breastfeeding No   BMI 29.96 kg/m²       Physical Exam at Discharge:  General Appearance: NAD, conversant  HENT: normocephalic/atraumatic, moist mucus membranes  Lungs: CTA with normal respiratory effort  CV: RRR, no m/r/g  Abdomen: soft, non-tender, normal bowel sounds  Extremities: no cyanosis, no peripheral edema  Neuro: moves all extremities, no focal deficits  Psych: appropriate affect, alert and oriented to person, place and time    Labs Prior to Discharge:  Labs: Results:       Chemistry Recent Labs     04/02/20  1317   *      K 5.2      CO2 23   BUN 43*   CREA 1.23   CA 9.0   AGAP 8   BUCR 35*      TP 7.4   ALB 2.7*   GLOB 4.7*   AGRAT 0.6*      CBC w/Diff Recent Labs     04/03/20  1037 04/02/20  1317   WBC 12.4 18.5*   RBC 4.21 4.73   HGB 10.8* 12.1   HCT 34.1* 36.7   * 493*   GRANS 66 76*   LYMPH 18* 15*   EOS 8* 3      Cardiac Enzymes No results for input(s): CPK, CKND1, SHON in the last 72 hours. No lab exists for component: CKRMB, TROIP   Coagulation No results for input(s): PTP, INR, APTT, INREXT in the last 72 hours. Lipid Panel Lab Results   Component Value Date/Time    Cholesterol, total 283 (H) 12/05/2014 11:48 AM    HDL Cholesterol 58 12/05/2014 11:48 AM    LDL, calculated 181 (H) 12/05/2014 11:48 AM    VLDL, calculated 44 (H) 12/05/2014 11:48 AM    Triglyceride 218 (H) 12/05/2014 11:48 AM    CHOL/HDL Ratio 4.2 09/09/2010 08:00 AM      BNP No results for input(s): BNPP in the last 72 hours. Liver Enzymes Recent Labs     04/02/20  1317   TP 7.4   ALB 2.7*      SGOT 39*      Thyroid Studies Lab Results   Component Value Date/Time    TSH 1.300 12/05/2014 11:48 AM            Significant Imaging:  Ct Abd Pelv W Cont    Result Date: 4/2/2020  EXAM: CT of the abdomen and pelvis INDICATION: Pain. COMPARISON: None. TECHNIQUE: Axial CT imaging of the abdomen and pelvis was performed with intravenous contrast. Multiplanar reformats were generated.  One or more dose reduction techniques were used on this CT: automated exposure control, adjustment of the mAs and/or kVp according to patient size, and iterative reconstruction techniques. The specific techniques used on this CT exam have been documented in the patient's electronic medical record. Digital Imaging and Communications in Medicine (DICOM) format image data are available to nonaffiliated external healthcare facilities or entities on a secure, media free, reciprocally searchable basis with patient authorization for at least a 12-month period after this study. _______________ FINDINGS: LOWER CHEST: Patchy bilateral interstitial infiltrates with groundglass densities. Cardiomegaly. LIVER, BILIARY: Liver is normal. No biliary dilation. Gallbladder is unremarkable. PANCREAS: Normal. SPLEEN: Normal. ADRENALS: Normal. KIDNEYS/URETERS/BLADDER: No calcification, hydronephrosis, or soft tissue attenuation renal mass. Bilateral cortical defects versus cysts. PELVIC ORGANS: Unremarkable. VASCULATURE: Extensive vascular calcifications LYMPH NODES: No enlarged lymph nodes. GASTROINTESTINAL TRACT: Appendix measures up to 9 mm in diameter with mild periappendiceal stranding. Diverticulosis. No drainable fluid collection. BONES: No acute or aggressive osseous abnormalities identified. Right hip arthroplasty. OTHER: None. _______________     IMPRESSION: Findings suggestive of early acute appendicitis without perforation or abscess formation. Diverticulosis. Cardiomegaly Bilateral lung base interstitial infiltrates and groundglass opacities, representing edema versus atypical infection. Xr Chest Port    Result Date: 4/2/2020  EXAM: XR CHEST PORT CLINICAL INDICATION/HISTORY: SOB, dyspnea -Additional: None COMPARISON: None TECHNIQUE: Portable frontal view of the chest _______________ FINDINGS: SUPPORT DEVICES: None. HEART AND MEDIASTINUM: Cardiomegaly. Left chest last views/pacemaker. LUNGS AND PLEURAL SPACES: Hypoinflated lungs. Small right and small to moderate left pleural effusions with adjacent atelectasis. No pneumothorax.  Mild interstitial edema. _______________     IMPRESSION: Hypoinflated lungs. Small right and small to moderate left pleural effusions with adjacent atelectasis. No pneumothorax. Mild interstitial edema. Discharge Medications:     Current Discharge Medication List      START taking these medications    Details   metroNIDAZOLE (Flagyl) 500 mg tablet Take 1 Tab by mouth three (3) times daily for 8 days. Qty: 24 Tab, Refills: 0      cephALEXin (Keflex) 500 mg capsule Take 1 Cap by mouth four (4) times daily for 8 days. Qty: 32 Cap, Refills: 0         CONTINUE these medications which have NOT CHANGED    Details   amiodarone (CORDARONE) 200 mg tablet Take 1 Tab by mouth daily. Qty: 30 Tab, Refills: 5      metoprolol succinate (TOPROL-XL) 50 mg XL tablet Take 1 Tab by mouth daily. Qty: 30 Tab, Refills: 5      fentaNYL (DURAGESIC) 75 mcg/hr 1 Patch by TransDERmal route every seventy-two (72) hours. Max Daily Amount: 1 Patch. Qty: 30 Patch, Refills: 0      furosemide (LASIX) 40 mg tablet TAKE 1 TABLET (40 MG) ONCE A DAY IN THE AM AND THEN MAY USE 2ND DOSE IN EVENING AS NEEDED FOR SWELLING. Qty: 180 Tab, Refills: 1      hydrALAZINE (APRESOLINE) 25 mg tablet Take 1 Tab by mouth three (3) times daily. Qty: 90 Tab, Refills: 5      fentaNYL (DURAGESIC) 50 mcg/hr PATCH 1 Patch by TransDERmal route every seventy-two (72) hours. Max Daily Amount: 1 Patch. Qty: 5 Patch, Refills: 0      promethazine (PHENERGAN) 25 mg tablet Take 1 Tab by mouth every six (6) hours as needed for Nausea. Qty: 60 Tab, Refills: 2      ondansetron hcl (ZOFRAN, AS HYDROCHLORIDE,) 4 mg tablet Take 4 mg by mouth every eight (8) hours as needed for Nausea. morphine (ROXANOL) 100 mg/5 mL (20 mg/mL) concentrated solution 5 - 10 mg every 2 hours as needed for SOB or pain. Qty: 240 mL, Refills: 1      isosorbide dinitrate (ISORDIL) 30 mg tablet Take 1 Tab by mouth two (2) times a day.   Qty: 60 Tab, Refills: 5      potassium chloride (K-DUR, KLOR-CON) 20 mEq tablet Take 0.5 tablets by mouth two (2) times a day. Qty: 45 tablet, Refills: 3      senna (SENNA) 8.6 mg tablet Take 1 tablet by mouth daily. docusate sodium (COLACE) 100 mg capsule Take 100 mg by mouth two (2) times a day. aspirin 81 mg chewable tablet Take 81 mg by Mouth Once a Day. STOP taking these medications       HYDROcodone-acetaminophen (NORCO)  mg tablet Comments:   Reason for Stopping:         guaiFENesin-codeine (ROBITUSSIN AC)  mg/5 mL solution Comments:   Reason for Stopping:               Activity: PT/OT per Home Health    Diet: Full liquids, advance as tolerated    Wound Care: None needed    Follow-up:   Please follow up with your PCP within 7 days to discuss your recent hospitalization. Patient to arrange.          Total time spent including time spent on final examination and discharge discussion, discharge documentation and records reviewed and medication reconciliation: > 30 minutes    Mago Reinoso DO  Internal Medicine, Hospitalist  Pager: [phone] [facility]

## 2020-04-04 ENCOUNTER — PATIENT OUTREACH (OUTPATIENT)
Dept: INTERNAL MEDICINE CLINIC | Age: 85
End: 2020-04-04

## 2020-04-04 ENCOUNTER — HOME CARE VISIT (OUTPATIENT)
Dept: HOME HEALTH SERVICES | Facility: HOME HEALTH | Age: 85
End: 2020-04-04

## 2020-04-04 NOTE — PROGRESS NOTES
Patient contacted regarding recent discharge and COVID-19 risk     Care Transition Nurse/ Ambulatory Care Manager contacted the family by telephone to perform post discharge assessment. Verified name and  with family as identifiers. Patient has following risk factors of: heart failure, sepsis, immunocompromised, Diabetes    CTN/ACM reviewed discharge instructions, medical action plan and red flags related to discharge diagnosis. Reviewed and educated them on any new and changed medications related to discharge diagnosis. Advised obtaining a 90-day supply of all daily and as-needed medications. Education provided regarding infection prevention, and signs and symptoms of COVID-19 and when to seek medical attention with family who verbalized understanding. Discussed exposure protocols and quarantine from [address] you at higher risk for severe illness  and given an opportunity for questions and concerns. The family agrees to contact the COVID-19 hotline [phone] or PCP office for questions related to their healthcare. CTN/ACM provided contact information for future reference. From CDC: Are you at higher risk for severe illness?  Wash your hands often.  Avoid close contact (6 feet, which is about two arm lengths) with people who are sick.  Put distance between yourself and other people if COVID-19 is spreading in your community.  Clean and disinfect frequently touched surfaces.  Avoid all cruise travel and non-essential air travel.  Call your healthcare professional if you have concerns about COVID-19 and your underlying condition or if you are sick.     For more information on steps you can take to protect yourself, see CDC's How to Protect Yourself

## 2020-04-05 ENCOUNTER — HOME CARE VISIT (OUTPATIENT)
Dept: HOME HEALTH SERVICES | Facility: HOME HEALTH | Age: 85
End: 2020-04-05

## 2020-04-06 ENCOUNTER — HOME CARE VISIT (OUTPATIENT)
Dept: HOME HEALTH SERVICES | Facility: HOME HEALTH | Age: 85
End: 2020-04-06

## 2020-04-06 ENCOUNTER — HOME CARE VISIT (OUTPATIENT)
Dept: SCHEDULING | Facility: HOME HEALTH | Age: 85
End: 2020-04-06

## 2020-04-20 ENCOUNTER — PATIENT OUTREACH (OUTPATIENT)
Dept: INTERNAL MEDICINE CLINIC | Age: 85
End: 2020-04-20

## 2020-04-20 NOTE — PROGRESS NOTES
COVID-19 Screening Discharge Note    Patient contacted regarding COVID-19  risk. Care Transition Nurse/ Ambulatory Care Manager contacted the family by telephone to perform post discharge assessment. Verified name and  with family as identifiers. Provided introduction to self, and explanation of the CTN/ACM role, and reason for call due to risk factors for infection and/or exposure to COVID-19. Symptoms reviewed with family who verbalized the following symptoms: no new/worsening symptoms       Due to no new or worsening symptoms encounter was not routed to provider for escalation. Patient has following risk factors of: heart failure, diabetes, COPD. CTN/ACM reviewed discharge instructions, medical action plan and red flags such as increased shortness of breath, increasing fever and signs of decompensation with family who verbalized understanding. Discussed exposure protocols and quarantine with CDC Guidelines What to do if you are sick with coronavirus disease 2019 Family who was given an opportunity for questions and concerns. The family agrees to contact the [facility] exposure line [phone], [facility] department [facility]  [phone] and PCP office for questions related to their healthcare. CTN/ACM provided contact information for future reference. Reviewed and educated family on any new and changed medications related to discharge diagnosis     Patient/family/caregiver given information for [facility] and agrees to enroll no    Patient's preferred e-mail:      Based on Loop alert triggers, patient will be contacted by nurse care manager for worsening symptoms.     Plan for discharge from [facility] based on status of symptoms and risk factors